# Patient Record
Sex: FEMALE | Race: WHITE
[De-identification: names, ages, dates, MRNs, and addresses within clinical notes are randomized per-mention and may not be internally consistent; named-entity substitution may affect disease eponyms.]

---

## 2018-03-06 ENCOUNTER — HOSPITAL ENCOUNTER (OUTPATIENT)
Dept: HOSPITAL 92 - BICCT | Age: 83
Discharge: HOME | End: 2018-03-06
Attending: INTERNAL MEDICINE
Payer: MEDICARE

## 2018-03-06 DIAGNOSIS — I70.0: ICD-10-CM

## 2018-03-06 DIAGNOSIS — I77.810: ICD-10-CM

## 2018-03-06 DIAGNOSIS — I73.9: Primary | ICD-10-CM

## 2018-03-06 DIAGNOSIS — I77.1: ICD-10-CM

## 2018-03-06 PROCEDURE — 71275 CT ANGIOGRAPHY CHEST: CPT

## 2018-03-06 PROCEDURE — 74174 CTA ABD&PLVS W/CONTRAST: CPT

## 2020-01-31 ENCOUNTER — HOSPITAL ENCOUNTER (OUTPATIENT)
Dept: HOSPITAL 92 - TBSIIMAG | Age: 85
Discharge: HOME | End: 2020-01-31
Attending: NEUROLOGICAL SURGERY
Payer: MEDICARE

## 2020-01-31 DIAGNOSIS — M54.5: Primary | ICD-10-CM

## 2020-01-31 DIAGNOSIS — M47.816: ICD-10-CM

## 2020-01-31 PROCEDURE — 72110 X-RAY EXAM L-2 SPINE 4/>VWS: CPT

## 2020-01-31 NOTE — RAD
LUMBAR SPINE SERIES 4 VIEWS:

 

Date:  01/31/2020

 

HISTORY:  

Low back pain with pain down left leg. 

 

FINDINGS:

Severe arthritic changes of the spine with marked degenerative disc narrowing at all vertebral body l
evels and prominent osteophytic change. Slight scoliotic change to the spine is noted. Prominent dege
nerative facet changes are noted. There are extensive vascular calcifications. 

 

IMPRESSION: 

Severe osteoarthritic changes of the spine. 

 

 

POS: [facility]

## 2020-02-21 ENCOUNTER — HOSPITAL ENCOUNTER (OUTPATIENT)
Dept: HOSPITAL 92 - TBSIIMAG | Age: 85
Discharge: HOME | End: 2020-02-21
Attending: PHYSICIAN ASSISTANT
Payer: MEDICARE

## 2020-02-21 DIAGNOSIS — M47.26: Primary | ICD-10-CM

## 2020-02-21 DIAGNOSIS — M48.05: ICD-10-CM

## 2020-02-21 DIAGNOSIS — M48.07: ICD-10-CM

## 2020-02-21 DIAGNOSIS — M48.061: ICD-10-CM

## 2020-02-21 PROCEDURE — 72110 X-RAY EXAM L-2 SPINE 4/>VWS: CPT

## 2020-02-21 PROCEDURE — 72148 MRI LUMBAR SPINE W/O DYE: CPT

## 2020-02-21 NOTE — MRI
MR the lumbar spine without contrast:

2/21/2020



History: Low back pain extending down the left leg, lower extremity radiculopathy



COMPARISON: None



TECHNIQUE: Multiplanar multisequence MR images were obtained of lumbar spine without IV contrast



FINDINGS:



On the basis of 5 lumbar type vertebral bodies, conus medullaris terminates at theL1-2 level.



Sagittal STIR imaging demonstrates no significant focal area of osseous marrow edema.



Mild anterolisthesis at L4-5 measures 6 mm.



T12-L1:There is disc space narrowing with disc desiccation. There is disc bulge with a small central 
disc herniation with slight superior migration. Mild associated central canal stenosis. Bilateral

facet hypertrophy with mild bilateral neural foraminal stenosis.



L1-2:Anterior and right lateral osteophyte formation. Bilateral facet hypertrophy. There is disc spac
e narrowing with disc desiccation. There is a disc osteophyte complex with mild central canal

stenosis. Mild left and moderate/severe right neural foraminal stenosis.



L2-3:Bilateral facet hypertrophy and hypertrophy of the ligamentum flavum. Disc space narrowing with 
disc desiccation and disc bulge. Severe central canal stenosis with mass effect upon the nerve

roots of the cauda equina. Mild right and moderate left neural foraminal stenosis.



L3-4:Disc space narrowing with disc desiccation and vacuum disc formation. Disc bulge with severe lj
tral canal stenosis. Prominent bilateral facet hypertrophy. Mild right and moderate left neural

foraminal stenosis.



L4-5:There is disc space narrowing with disc desiccation and disc bulge. Prominent bilateral facet hy
pertrophy and hypertrophy of the ligamentum flavum. Severe central canal stenosis with mass effect

upon nerve roots of the cauda equina. Mild/moderate bilateral neural foraminal stenosis.



L5-S1:There is disc space narrowing with disc desiccation. Bilateral facet hypertrophy. Small central
 disc protrusion. Mild central canal stenosis. Severe right and mild left neural foraminal

stenosis.



Image retroperitoneal structures demonstratea T2 hyperintense lesion emanating from the upper pole of
 the left kidney measuring 5.5 cm, consistent with renal cyst. Additional medial lower pole left

renal cyst measures 3.3 cm.



IMPRESSION:

Multilevel severe degenerative changes are noted within the lumbar spine as detailed above. This incl
udes multilevel severe central canal stenosis at L2-3, L3-4, L4-5, as well as areas of multilevel

neural foraminal stenosis. 



Reported By: Cameron Sal 

Electronically Signed:  2/21/2020 10:22 AM